# Patient Record
(demographics unavailable — no encounter records)

---

## 2025-03-05 NOTE — HISTORY OF PRESENT ILLNESS
[de-identified] : Presents with son and daughter for BP check, labs, and general follow-up; also note S/P fall with scalp laceration - stapled in the ER - to have staples removed today. Reviewed all ER documentation - note following with Neurology.

## 2025-03-05 NOTE — PHYSICAL EXAM
[No Acute Distress] : no acute distress [Normal] : normal rate, regular rhythm, normal S1 and S2 and no murmur heard [No Edema] : there was no peripheral edema [Soft] : abdomen soft [Non Tender] : non-tender [Normal Posterior Cervical Nodes] : no posterior cervical lymphadenopathy [Normal Anterior Cervical Nodes] : no anterior cervical lymphadenopathy [de-identified] : well healed scalp laceration R parietal area; staples intact [de-identified] : unable to assess [de-identified] : no meaningful communication

## 2025-03-05 NOTE — ASSESSMENT
[FreeTextEntry1] : Hemodynamically stable with acceptable BP Lab profiles drawn in office and sent Healed scalp laceration - all staples removed without issue

## 2025-03-20 NOTE — HISTORY OF PRESENT ILLNESS
[FreeTextEntry1] : 86-year-old male with PMH of HTN, BPH, Dementia had a fall laceration to the forehead on 2/22/25. The fall was witnessed by patient's friend, patient fell backward. No LOC, no nausea vomiting, no change in mental status. Has dementia, unable to make his needs known.  CT head in the ER with bilateral SAH.  Family brought him on wheelchair, reporting that he is very agitated at times. Pt had seen Dr. Henderson almost a year ago and was ordered few medications. Pt continued to have aggressive behaviors and cursing most of the time. Family looking for adjusting his medications to manage his behavioral issue. Pt's PCP is not comfortable adjusting medication. Pt is alert, very pleasant and is able to follow some commands.

## 2025-03-20 NOTE — ASSESSMENT
[FreeTextEntry1] : IMPRESSION  86-year-old male with PMH of HTN, BPH, Dementia had a fall laceration to the forehead on 2/22/25. Patient fell backward, No LOC, no nausea vomiting, no change in mental status. Has dementia, unable to make his needs known.  CT head in the ER with bilateral SAH. Family brought him on wheelchair, reporting that he is very agitated at times. Pt had seen Dr. Henderson almost a year ago and was ordered few medications, but those are not helping. Pt will have f/u with Dr. Henderson in April 2025. CT head on 3/12/25 with slightly decreased right frontal subarachnoid hemorrhage compared with 2/22/2025.   PLAN:  F/U with neurology for Alzheimer's  Consult with [facility] [place] for agitation management F/U with Dr. Alba for possible medication/ rehab evaluation CT Head in 1 month  F/U after imaging

## 2025-03-20 NOTE — REVIEW OF SYSTEMS
[Confused or Disoriented] : confusion [Leg Weakness] : leg weakness [Difficulty Walking] : difficulty walking [Negative] : Endocrine [Emotional Problems] : emotional problems

## 2025-03-20 NOTE — PHYSICAL EXAM
[General Appearance - Alert] : alert [General Appearance - In No Acute Distress] : in no acute distress [General Appearance - Well Nourished] : well nourished [General Appearance - Well-Appearing] : healthy appearing [Oriented To Time, Place, And Person] : oriented to person, place, and time [Affect] : the affect was normal [Memory Recent] : recent memory was not impaired [Person] : oriented to person [Place] : oriented to place [Time] : oriented to time [Short Term Intact] : short term memory intact [Cranial Nerves Optic (II)] : visual acuity intact bilaterally,  pupils equal round and reactive to light [Cranial Nerves Oculomotor (III)] : extraocular motion intact [Cranial Nerves Trigeminal (V)] : facial sensation intact symmetrically [Cranial Nerves Facial (VII)] : face symmetrical [Cranial Nerves Vestibulocochlear (VIII)] : hearing was intact bilaterally [Cranial Nerves Accessory (XI - Cranial And Spinal)] : head turning and shoulder shrug symmetric [Cranial Nerves Hypoglossal (XII)] : there was no tongue deviation with protrusion [Motor Tone] : muscle tone was normal in all four extremities [No Spinal Tenderness] : no spinal tenderness [Involuntary Movements] : no involuntary movements were seen [FreeTextEntry8] : no drift, able to walk with two person assistance with small steps. [Sclera] : the sclera and conjunctiva were normal [PERRL With Normal Accommodation] : pupils were equal in size, round, reactive to light, with normal accommodation [] : no respiratory distress [Heart Rate And Rhythm] : heart rate was normal and rhythm regular

## 2025-03-20 NOTE — ASSESSMENT
[FreeTextEntry1] : IMPRESSION  86-year-old male with PMH of HTN, BPH, Dementia had a fall laceration to the forehead on 2/22/25. Patient fell backward, No LOC, no nausea vomiting, no change in mental status. Has dementia, unable to make his needs known.  CT head in the ER with bilateral SAH. Family brought him on wheelchair, reporting that he is very agitated at times. Pt had seen Dr. Henderson almost a year ago and was ordered few medications, but those are not helping. Pt will have f/u with Dr. Henderson in April 2025. CT head on 3/12/25 with slightly decreased right frontal subarachnoid hemorrhage compared with 2/22/2025.   PLAN:  F/U with neurology for Alzheimer's  Consult with [facility] for agitation management F/U with Dr. Alba for possible medication/ rehab evaluation CT Head in 1 month  F/U after imaging

## 2025-06-04 NOTE — HISTORY OF PRESENT ILLNESS
[FreeTextEntry1] : 86-year-old male with PMH of HTN, BPH, Dementia had a fall laceration to the forehead on 2/22/25. The fall was witnessed by patient's friend, patient fell backward. No LOC, no nausea vomiting, no change in mental status. Has dementia, unable to make his needs known.  CT head in the ER with bilateral SAH. CT head on 3/12/25 with slightly decreased right frontal subarachnoid hemorrhage compared with 2/22/2025.

## 2025-06-04 NOTE — ASSESSMENT
[FreeTextEntry1] : IMPRESSION  86-year-old male with PMH of HTN, BPH, Dementia had a fall laceration to the forehead on 2/22/25. Patient fell backward, No LOC, no nausea vomiting, no change in mental status. Has dementia, unable to make his needs known.  CT head in the ER with bilateral SAH. Family brought him on wheelchair, reporting that he is very agitated at times. Pt had seen Dr. Henderson almost a year ago and was ordered few medications, but those are not helping. Pt will have f/u with Dr. Henderson in April 2025. CT head on 3/12/25 with slightly decreased right frontal subarachnoid hemorrhage compared with 2/22/2025.   PLAN:

## 2025-06-11 NOTE — ASSESSMENT
[FreeTextEntry1] : Hemodynamically stable with acceptable BP Neuropsych findings consistent with history

## 2025-06-11 NOTE — HISTORY OF PRESENT ILLNESS
[de-identified] : Brought by daughter for general follow-up.  Having increasing cognitive issues associated with combativeness, especially when being bathed and changed.  Continuing with PT/OT at home as tolerated.

## 2025-06-11 NOTE — PHYSICAL EXAM
[No Acute Distress] : no acute distress [Normal] : normal rate, regular rhythm, normal S1 and S2 and no murmur heard [No Edema] : there was no peripheral edema [Soft] : abdomen soft [Non Tender] : non-tender [Normal Posterior Cervical Nodes] : no posterior cervical lymphadenopathy [Normal Anterior Cervical Nodes] : no anterior cervical lymphadenopathy [Motor Strength Lower Extremities Bilaterally] : there was weakness in both lower extremities [de-identified] : examined in WC - did not attempt to stand patient [de-identified] : no obvious focal deficits; very suboptimal exam [de-identified] : confused; essentially non-verbal